# Patient Record
Sex: FEMALE | Race: WHITE | NOT HISPANIC OR LATINO | Employment: FULL TIME | URBAN - METROPOLITAN AREA
[De-identification: names, ages, dates, MRNs, and addresses within clinical notes are randomized per-mention and may not be internally consistent; named-entity substitution may affect disease eponyms.]

---

## 2023-08-08 LAB
EXTERNAL HEPATITIS B SURFACE ANTIGEN: NEGATIVE
EXTERNAL HEPATITIS C AB: NON REACTIVE
EXTERNAL RUBELLA QUALITATIVE: NORMAL
EXTERNAL SYPHILIS RPR SCREEN: NORMAL
HIV1 P24 AG SERPL QL IA: NORMAL

## 2024-02-20 LAB — EXTERNAL GROUP B STREP ANTIGEN: POSITIVE

## 2024-03-14 ENCOUNTER — HOSPITAL ENCOUNTER (INPATIENT)
Facility: HOSPITAL | Age: 34
LOS: 3 days | Discharge: HOME | End: 2024-03-17
Attending: OBSTETRICS & GYNECOLOGY | Admitting: OBSTETRICS & GYNECOLOGY
Payer: COMMERCIAL

## 2024-03-14 ENCOUNTER — ANESTHESIA (OUTPATIENT)
Dept: LABOR AND DELIVERY | Facility: HOSPITAL | Age: 34
End: 2024-03-14
Payer: COMMERCIAL

## 2024-03-14 ENCOUNTER — ANESTHESIA EVENT (OUTPATIENT)
Dept: LABOR AND DELIVERY | Facility: HOSPITAL | Age: 34
End: 2024-03-14
Payer: COMMERCIAL

## 2024-03-14 PROBLEM — Z34.90 PREGNANT: Status: ACTIVE | Noted: 2024-03-14

## 2024-03-14 LAB
ABO GROUP BLD: NORMAL
ALBUMIN SERPL-MCNC: 3.5 G/DL (ref 3.5–5.2)
ALBUMIN/GLOB SERPL: 1.3 G/DL
ALP SERPL-CCNC: 108 U/L (ref 39–117)
ALT SERPL W P-5'-P-CCNC: 14 U/L (ref 1–33)
ANION GAP SERPL CALCULATED.3IONS-SCNC: 15.9 MMOL/L (ref 5–15)
AST SERPL-CCNC: 23 U/L (ref 1–32)
BASOPHILS # BLD AUTO: 0.04 10*3/MM3 (ref 0–0.2)
BASOPHILS NFR BLD AUTO: 0.5 % (ref 0–1.5)
BILIRUB SERPL-MCNC: <0.2 MG/DL (ref 0–1.2)
BLD GP AB SCN SERPL QL: NEGATIVE
BUN SERPL-MCNC: 5 MG/DL (ref 6–20)
BUN/CREAT SERPL: 9.6 (ref 7–25)
CALCIUM SPEC-SCNC: 8.6 MG/DL (ref 8.6–10.5)
CHLORIDE SERPL-SCNC: 106 MMOL/L (ref 98–107)
CO2 SERPL-SCNC: 16.1 MMOL/L (ref 22–29)
CREAT SERPL-MCNC: 0.52 MG/DL (ref 0.57–1)
DEPRECATED RDW RBC AUTO: 40.9 FL (ref 37–54)
EGFRCR SERPLBLD CKD-EPI 2021: 127.6 ML/MIN/1.73
EOSINOPHIL # BLD AUTO: 0.29 10*3/MM3 (ref 0–0.4)
EOSINOPHIL NFR BLD AUTO: 3.3 % (ref 0.3–6.2)
ERYTHROCYTE [DISTWIDTH] IN BLOOD BY AUTOMATED COUNT: 13.6 % (ref 12.3–15.4)
GLOBULIN UR ELPH-MCNC: 2.7 GM/DL
GLUCOSE SERPL-MCNC: 130 MG/DL (ref 65–99)
HCT VFR BLD AUTO: 36.7 % (ref 34–46.6)
HGB BLD-MCNC: 12.4 G/DL (ref 12–15.9)
IMM GRANULOCYTES # BLD AUTO: 0.04 10*3/MM3 (ref 0–0.05)
IMM GRANULOCYTES NFR BLD AUTO: 0.5 % (ref 0–0.5)
LYMPHOCYTES # BLD AUTO: 2.2 10*3/MM3 (ref 0.7–3.1)
LYMPHOCYTES NFR BLD AUTO: 25.2 % (ref 19.6–45.3)
MCH RBC QN AUTO: 28.1 PG (ref 26.6–33)
MCHC RBC AUTO-ENTMCNC: 33.8 G/DL (ref 31.5–35.7)
MCV RBC AUTO: 83.2 FL (ref 79–97)
MONOCYTES # BLD AUTO: 0.51 10*3/MM3 (ref 0.1–0.9)
MONOCYTES NFR BLD AUTO: 5.8 % (ref 5–12)
NEUTROPHILS NFR BLD AUTO: 5.64 10*3/MM3 (ref 1.7–7)
NEUTROPHILS NFR BLD AUTO: 64.7 % (ref 42.7–76)
NRBC BLD AUTO-RTO: 0 /100 WBC (ref 0–0.2)
PLATELET # BLD AUTO: 193 10*3/MM3 (ref 140–450)
PMV BLD AUTO: 11.3 FL (ref 6–12)
POTASSIUM SERPL-SCNC: 3.9 MMOL/L (ref 3.5–5.2)
PROT SERPL-MCNC: 6.2 G/DL (ref 6–8.5)
RBC # BLD AUTO: 4.41 10*6/MM3 (ref 3.77–5.28)
RH BLD: POSITIVE
SODIUM SERPL-SCNC: 138 MMOL/L (ref 136–145)
T PALLIDUM IGG SER QL: NORMAL
T&S EXPIRATION DATE: NORMAL
WBC NRBC COR # BLD AUTO: 8.72 10*3/MM3 (ref 3.4–10.8)

## 2024-03-14 PROCEDURE — C1755 CATHETER, INTRASPINAL: HCPCS | Performed by: STUDENT IN AN ORGANIZED HEALTH CARE EDUCATION/TRAINING PROGRAM

## 2024-03-14 PROCEDURE — 10907ZC DRAINAGE OF AMNIOTIC FLUID, THERAPEUTIC FROM PRODUCTS OF CONCEPTION, VIA NATURAL OR ARTIFICIAL OPENING: ICD-10-PCS | Performed by: OBSTETRICS & GYNECOLOGY

## 2024-03-14 PROCEDURE — 86900 BLOOD TYPING SEROLOGIC ABO: CPT | Performed by: OBSTETRICS & GYNECOLOGY

## 2024-03-14 PROCEDURE — 86850 RBC ANTIBODY SCREEN: CPT | Performed by: OBSTETRICS & GYNECOLOGY

## 2024-03-14 PROCEDURE — 80053 COMPREHEN METABOLIC PANEL: CPT | Performed by: OBSTETRICS & GYNECOLOGY

## 2024-03-14 PROCEDURE — 25010000002 PENICILLIN G POTASSIUM PER 600000 UNITS: Performed by: OBSTETRICS & GYNECOLOGY

## 2024-03-14 PROCEDURE — 3E033VJ INTRODUCTION OF OTHER HORMONE INTO PERIPHERAL VEIN, PERCUTANEOUS APPROACH: ICD-10-PCS | Performed by: OBSTETRICS & GYNECOLOGY

## 2024-03-14 PROCEDURE — 25810000003 LACTATED RINGERS PER 1000 ML: Performed by: OBSTETRICS & GYNECOLOGY

## 2024-03-14 PROCEDURE — 85025 COMPLETE CBC W/AUTO DIFF WBC: CPT | Performed by: OBSTETRICS & GYNECOLOGY

## 2024-03-14 PROCEDURE — 86780 TREPONEMA PALLIDUM: CPT | Performed by: OBSTETRICS & GYNECOLOGY

## 2024-03-14 PROCEDURE — 25010000002 ROPIVACAINE PER 1 MG: Performed by: ANESTHESIOLOGY

## 2024-03-14 PROCEDURE — 86901 BLOOD TYPING SEROLOGIC RH(D): CPT | Performed by: OBSTETRICS & GYNECOLOGY

## 2024-03-14 RX ORDER — OXYTOCIN/0.9 % SODIUM CHLORIDE 30/500 ML
250 PLASTIC BAG, INJECTION (ML) INTRAVENOUS CONTINUOUS
Status: ACTIVE | OUTPATIENT
Start: 2024-03-15 | End: 2024-03-15

## 2024-03-14 RX ORDER — FAMOTIDINE 20 MG/1
TABLET, FILM COATED ORAL
COMMUNITY
End: 2024-03-17 | Stop reason: HOSPADM

## 2024-03-14 RX ORDER — FAMOTIDINE 10 MG/ML
20 INJECTION, SOLUTION INTRAVENOUS 2 TIMES DAILY PRN
Status: DISCONTINUED | OUTPATIENT
Start: 2024-03-14 | End: 2024-03-15 | Stop reason: HOSPADM

## 2024-03-14 RX ORDER — PRENATAL VIT NO.126/IRON/FOLIC 28MG-0.8MG
TABLET ORAL DAILY
COMMUNITY

## 2024-03-14 RX ORDER — ONDANSETRON 4 MG/1
4 TABLET, ORALLY DISINTEGRATING ORAL EVERY 6 HOURS PRN
Status: DISCONTINUED | OUTPATIENT
Start: 2024-03-14 | End: 2024-03-15 | Stop reason: HOSPADM

## 2024-03-14 RX ORDER — FENTANYL/ROPIVACAINE/NS/PF 2MCG/ML-.2
10 PLASTIC BAG, INJECTION (ML) INJECTION CONTINUOUS
Status: DISCONTINUED | OUTPATIENT
Start: 2024-03-14 | End: 2024-03-15

## 2024-03-14 RX ORDER — VIT C/HESPERIDIN/BIOFLAVONOIDS 500-100 MG
TABLET ORAL
COMMUNITY

## 2024-03-14 RX ORDER — OXYTOCIN/0.9 % SODIUM CHLORIDE 30/500 ML
2-20 PLASTIC BAG, INJECTION (ML) INTRAVENOUS
Status: DISCONTINUED | OUTPATIENT
Start: 2024-03-14 | End: 2024-03-15

## 2024-03-14 RX ORDER — FAMOTIDINE 20 MG/1
20 TABLET, FILM COATED ORAL 2 TIMES DAILY
COMMUNITY

## 2024-03-14 RX ORDER — MAGNESIUM CARB/ALUMINUM HYDROX 105-160MG
30 TABLET,CHEWABLE ORAL ONCE AS NEEDED
Status: DISCONTINUED | OUTPATIENT
Start: 2024-03-14 | End: 2024-03-15 | Stop reason: HOSPADM

## 2024-03-14 RX ORDER — OMEPRAZOLE 40 MG/1
CAPSULE, DELAYED RELEASE ORAL
COMMUNITY

## 2024-03-14 RX ORDER — ROPIVACAINE HYDROCHLORIDE 2 MG/ML
INJECTION, SOLUTION EPIDURAL; INFILTRATION; PERINEURAL AS NEEDED
Status: DISCONTINUED | OUTPATIENT
Start: 2024-03-14 | End: 2024-03-15 | Stop reason: SURG

## 2024-03-14 RX ORDER — ACETAMINOPHEN 325 MG/1
650 TABLET ORAL EVERY 4 HOURS PRN
Status: DISCONTINUED | OUTPATIENT
Start: 2024-03-14 | End: 2024-03-15 | Stop reason: HOSPADM

## 2024-03-14 RX ORDER — ONDANSETRON 2 MG/ML
4 INJECTION INTRAMUSCULAR; INTRAVENOUS EVERY 6 HOURS PRN
Status: DISCONTINUED | OUTPATIENT
Start: 2024-03-14 | End: 2024-03-15 | Stop reason: HOSPADM

## 2024-03-14 RX ORDER — OXYTOCIN/0.9 % SODIUM CHLORIDE 30/500 ML
999 PLASTIC BAG, INJECTION (ML) INTRAVENOUS ONCE
Status: DISCONTINUED | OUTPATIENT
Start: 2024-03-14 | End: 2024-03-15 | Stop reason: HOSPADM

## 2024-03-14 RX ORDER — SODIUM CHLORIDE 0.9 % (FLUSH) 0.9 %
10 SYRINGE (ML) INJECTION AS NEEDED
Status: DISCONTINUED | OUTPATIENT
Start: 2024-03-14 | End: 2024-03-15 | Stop reason: HOSPADM

## 2024-03-14 RX ORDER — CARBOPROST TROMETHAMINE 250 UG/ML
250 INJECTION, SOLUTION INTRAMUSCULAR
Status: DISCONTINUED | OUTPATIENT
Start: 2024-03-14 | End: 2024-03-15 | Stop reason: HOSPADM

## 2024-03-14 RX ORDER — MISOPROSTOL 200 UG/1
800 TABLET ORAL ONCE AS NEEDED
Status: DISCONTINUED | OUTPATIENT
Start: 2024-03-14 | End: 2024-03-15 | Stop reason: HOSPADM

## 2024-03-14 RX ORDER — FAMOTIDINE 20 MG/1
20 TABLET, FILM COATED ORAL 2 TIMES DAILY PRN
Status: DISCONTINUED | OUTPATIENT
Start: 2024-03-14 | End: 2024-03-15 | Stop reason: HOSPADM

## 2024-03-14 RX ORDER — DIPHENHYDRAMINE HYDROCHLORIDE 50 MG/ML
12.5 INJECTION INTRAMUSCULAR; INTRAVENOUS EVERY 8 HOURS PRN
Status: DISCONTINUED | OUTPATIENT
Start: 2024-03-14 | End: 2024-03-15 | Stop reason: HOSPADM

## 2024-03-14 RX ORDER — EPHEDRINE SULFATE 50 MG/ML
5 INJECTION, SOLUTION INTRAVENOUS
Status: DISCONTINUED | OUTPATIENT
Start: 2024-03-14 | End: 2024-03-15 | Stop reason: HOSPADM

## 2024-03-14 RX ORDER — SODIUM CHLORIDE, SODIUM LACTATE, POTASSIUM CHLORIDE, CALCIUM CHLORIDE 600; 310; 30; 20 MG/100ML; MG/100ML; MG/100ML; MG/100ML
125 INJECTION, SOLUTION INTRAVENOUS CONTINUOUS
Status: DISCONTINUED | OUTPATIENT
Start: 2024-03-14 | End: 2024-03-15

## 2024-03-14 RX ORDER — LIDOCAINE HYDROCHLORIDE 10 MG/ML
0.5 INJECTION, SOLUTION INFILTRATION; PERINEURAL ONCE AS NEEDED
Status: DISCONTINUED | OUTPATIENT
Start: 2024-03-14 | End: 2024-03-15 | Stop reason: HOSPADM

## 2024-03-14 RX ORDER — MISOPROSTOL 100 MCG
25 TABLET ORAL
Status: DISCONTINUED | OUTPATIENT
Start: 2024-03-14 | End: 2024-03-14

## 2024-03-14 RX ORDER — TERBUTALINE SULFATE 1 MG/ML
0.25 INJECTION, SOLUTION SUBCUTANEOUS AS NEEDED
Status: DISCONTINUED | OUTPATIENT
Start: 2024-03-14 | End: 2024-03-15 | Stop reason: HOSPADM

## 2024-03-14 RX ORDER — ONDANSETRON 2 MG/ML
4 INJECTION INTRAMUSCULAR; INTRAVENOUS ONCE AS NEEDED
Status: DISCONTINUED | OUTPATIENT
Start: 2024-03-14 | End: 2024-03-15 | Stop reason: HOSPADM

## 2024-03-14 RX ORDER — PENICILLIN G 3000000 [IU]/50ML
3 INJECTION, SOLUTION INTRAVENOUS EVERY 4 HOURS
Status: DISCONTINUED | OUTPATIENT
Start: 2024-03-14 | End: 2024-03-15 | Stop reason: HOSPADM

## 2024-03-14 RX ORDER — METHYLERGONOVINE MALEATE 0.2 MG/ML
200 INJECTION INTRAVENOUS ONCE AS NEEDED
Status: DISCONTINUED | OUTPATIENT
Start: 2024-03-14 | End: 2024-03-15 | Stop reason: HOSPADM

## 2024-03-14 RX ADMIN — PENICILLIN G 3 MILLION UNITS: 3000000 INJECTION, SOLUTION INTRAVENOUS at 20:44

## 2024-03-14 RX ADMIN — SODIUM CHLORIDE, POTASSIUM CHLORIDE, SODIUM LACTATE AND CALCIUM CHLORIDE 125 ML/HR: 600; 310; 30; 20 INJECTION, SOLUTION INTRAVENOUS at 21:08

## 2024-03-14 RX ADMIN — Medication 10 ML/HR: at 18:53

## 2024-03-14 RX ADMIN — SODIUM CHLORIDE, POTASSIUM CHLORIDE, SODIUM LACTATE AND CALCIUM CHLORIDE 125 ML/HR: 600; 310; 30; 20 INJECTION, SOLUTION INTRAVENOUS at 12:42

## 2024-03-14 RX ADMIN — ROPIVACAINE HYDROCHLORIDE 5 ML: 2 INJECTION, SOLUTION EPIDURAL; INFILTRATION at 18:47

## 2024-03-14 RX ADMIN — Medication 2 MILLI-UNITS/MIN: at 12:39

## 2024-03-14 RX ADMIN — ROPIVACAINE HYDROCHLORIDE 4 ML: 2 INJECTION, SOLUTION EPIDURAL; INFILTRATION at 18:49

## 2024-03-14 RX ADMIN — ROPIVACAINE HYDROCHLORIDE 4 ML: 2 INJECTION, SOLUTION EPIDURAL; INFILTRATION at 18:51

## 2024-03-14 RX ADMIN — PENICILLIN G 3 MILLION UNITS: 3000000 INJECTION, SOLUTION INTRAVENOUS at 16:32

## 2024-03-14 RX ADMIN — PENICILLIN G POTASSIUM 5 MILLION UNITS: 5000000 INJECTION, POWDER, FOR SOLUTION INTRAMUSCULAR; INTRAVENOUS at 12:39

## 2024-03-14 RX ADMIN — SODIUM CHLORIDE, POTASSIUM CHLORIDE, SODIUM LACTATE AND CALCIUM CHLORIDE 999 ML/HR: 600; 310; 30; 20 INJECTION, SOLUTION INTRAVENOUS at 18:31

## 2024-03-14 NOTE — PROGRESS NOTES
Pitocin @ 16 mu/min - not very uncomfortable yet.    SVE 2/80/-1 AROM copious clear fluid.   's Cat I  Driggs @ 3-5 minutes    Continue current care.     Bro Rodriges MD  3/14/2024    17:46 EDT

## 2024-03-14 NOTE — ANESTHESIA PREPROCEDURE EVALUATION
Anesthesia Evaluation     Patient summary reviewed and Nursing notes reviewed                Airway   Mallampati: II  TM distance: >3 FB  Neck ROM: full  no difficulty expected  Dental - normal exam     Pulmonary - normal exam   Cardiovascular - normal exam    (+)  carotid artery disease      Neuro/Psych  GI/Hepatic/Renal/Endo    (+) GERD    Musculoskeletal     Abdominal  - normal exam   Substance History      OB/GYN    (+) Pregnant        Other                    Anesthesia Plan    ASA 2     epidural     (  39w6d  )    Anesthetic plan, risks, benefits, and alternatives have been provided, discussed and informed consent has been obtained with: patient.    CODE STATUS:    Level Of Support Discussed With: Patient  Code Status (Patient has no pulse and is not breathing): CPR (Attempt to Resuscitate)  Medical Interventions (Patient has pulse or is breathing): Full Support

## 2024-03-14 NOTE — H&P
[facility]  Obstetric History and Physical    Patient Name: Afua Willoughby  :  1990  MRN:  [id number]      Chief Complaint   Patient presents with    Scheduled Induction     Scheduled induction. +FM. Denies Lof or VB. Has had some vicki schuler.       Subjective     Patient is a 33 y.o. female  currently at 39w6d, who presents for term IOL.       Her prenatal care has been uncomplicated with exception of GBS POS.    Past Medical History:   Diagnosis Date    COVID-19     GERD (gastroesophageal reflux disease)      Past Surgical History:   Procedure Laterality Date    MOUTH SURGERY      dental implants due to trauma     Bactrim [sulfamethoxazole-trimethoprim] and Sulfa antibiotics      Objective       Vital Signs Range for the last 24 hours  Temperature:     Temp Source:     BP: BP: (134)/(94) 134/94   Pulse: Heart Rate:  [107] 107   Respirations: Resp:  [16] 16                   Physical Examination:     General :  Alert in NAD  Abdomen: Gravid, EFW 7# 12 oz by serafinds    Presentation: vertex   Cervix: Exam by: Method: sterile exam per RN   Dilation: Cervical Dilation (cm): 1-2   Effacement: Cervical Effacement: 70%   Station: Fetal Station: -3       Fetal Heart Rate Assessment   Method: Fetal HR Assessment Method: Telemetry/Wireless Fetal HR Monitor   Beats/min: Fetal HR (beats/min): 140   Baseline: Fetal HR Baseline: normal range   Varibility: Fetal HR Variability: moderate (amplitude range 6 to 25 bpm)   Accels: Fetal HR Accelerations: greater than/equal to 15 bpm   Decels: Fetal HR Decelerations: absent   Tracing Category:       Uterine Assessment   Method: Method: Telemetry/Wireless Uterine Activity Monitor   Frequency (min): Contraction Frequency (Minutes): 2-8   Ctx Count in 10 min:     Duration:     Intensity: Contraction Intensity: mild by palpation   Intensity by IUPC:     Resting Tone: Uterine Resting Tone: soft by palpation   Resting Tone by IUPC:     Bard Units:            Results from last 7 days   Lab Units 24  1126   WBC 10*3/mm3 8.72   HEMOGLOBIN g/dL 12.4   HEMATOCRIT % 36.7   PLATELETS 10*3/mm3 193       Assessment & Plan     1.  Intrauterine pregnancy at 39w6d weeks gestation for term IOL cervix favorable.    reassuring fetal status.   Continue pitocin and AROM when able. Anticipate .  Plan of care has been reviewed with patient and family.  All questions answered.      Pregnant        H&P updated. The patient was examined and the following changes are noted above.         Bro Rodriges MD  3/14/2024  15:08 EDT

## 2024-03-14 NOTE — ANESTHESIA PROCEDURE NOTES
Labor Epidural      Patient reassessed immediately prior to procedure    Patient location during procedure: OB  Start Time: 3/14/2024 6:42 PM  Stop Time: 3/14/2024 6:47 PM  Indication:at surgeon's request  Performed By  Anesthesiologist: Jorge Medeiros MD  Preanesthetic Checklist  Completed: patient identified, IV checked, site marked, risks and benefits discussed, surgical consent, monitors and equipment checked, pre-op evaluation and timeout performed  Prep:  Pt Position:sitting  Sterile Tech:cap, gloves and mask  Prep:povidone-iodine 7.5% surgical scrub  Monitoring:blood pressure monitoring, continuous pulse oximetry and EKG  Epidural Block Procedure:  Approach:midline  Guidance:landmark technique  Location:L4-L5  Needle Type:Tuohy  Needle Gauge:17  Loss of Resistance Medium: air  Loss of Resistance: 7cm  Cath Depth at skin:10 cm  Paresthesia: none  Aspiration:negative  Test Dose:negative  Post Assessment:  Dressing:occlusive dressing applied and secured with tape  Pt Tolerance:patient tolerated the procedure well with no apparent complications  Complications:no

## 2024-03-15 PROCEDURE — 0KQM0ZZ REPAIR PERINEUM MUSCLE, OPEN APPROACH: ICD-10-PCS | Performed by: OBSTETRICS & GYNECOLOGY

## 2024-03-15 RX ORDER — IBUPROFEN 600 MG/1
600 TABLET ORAL EVERY 6 HOURS PRN
Status: DISCONTINUED | OUTPATIENT
Start: 2024-03-15 | End: 2024-03-17 | Stop reason: HOSPADM

## 2024-03-15 RX ORDER — DOCUSATE SODIUM 100 MG/1
100 CAPSULE, LIQUID FILLED ORAL 2 TIMES DAILY
Status: DISCONTINUED | OUTPATIENT
Start: 2024-03-15 | End: 2024-03-17 | Stop reason: HOSPADM

## 2024-03-15 RX ORDER — MISOPROSTOL 200 UG/1
600 TABLET ORAL ONCE AS NEEDED
Status: DISCONTINUED | OUTPATIENT
Start: 2024-03-15 | End: 2024-03-17 | Stop reason: HOSPADM

## 2024-03-15 RX ORDER — OXYTOCIN/0.9 % SODIUM CHLORIDE 30/500 ML
125 PLASTIC BAG, INJECTION (ML) INTRAVENOUS ONCE AS NEEDED
Status: COMPLETED | OUTPATIENT
Start: 2024-03-15 | End: 2024-03-15

## 2024-03-15 RX ORDER — PRENATAL VIT/IRON FUM/FOLIC AC 27MG-0.8MG
1 TABLET ORAL DAILY
Status: DISCONTINUED | OUTPATIENT
Start: 2024-03-15 | End: 2024-03-17 | Stop reason: HOSPADM

## 2024-03-15 RX ORDER — HYDROXYZINE 50 MG/1
50 TABLET, FILM COATED ORAL NIGHTLY PRN
Status: DISCONTINUED | OUTPATIENT
Start: 2024-03-15 | End: 2024-03-17 | Stop reason: HOSPADM

## 2024-03-15 RX ORDER — TRAMADOL HYDROCHLORIDE 50 MG/1
50 TABLET ORAL EVERY 6 HOURS PRN
Status: DISCONTINUED | OUTPATIENT
Start: 2024-03-15 | End: 2024-03-17 | Stop reason: HOSPADM

## 2024-03-15 RX ORDER — HYDROCORTISONE 25 MG/G
CREAM TOPICAL AS NEEDED
Status: DISCONTINUED | OUTPATIENT
Start: 2024-03-15 | End: 2024-03-17 | Stop reason: HOSPADM

## 2024-03-15 RX ORDER — ONDANSETRON 4 MG/1
4 TABLET, ORALLY DISINTEGRATING ORAL EVERY 8 HOURS PRN
Status: DISCONTINUED | OUTPATIENT
Start: 2024-03-15 | End: 2024-03-17 | Stop reason: HOSPADM

## 2024-03-15 RX ORDER — PANTOPRAZOLE SODIUM 40 MG/1
40 TABLET, DELAYED RELEASE ORAL
Status: DISCONTINUED | OUTPATIENT
Start: 2024-03-15 | End: 2024-03-17 | Stop reason: HOSPADM

## 2024-03-15 RX ORDER — CALCIUM CARBONATE 500 MG/1
2 TABLET, CHEWABLE ORAL 3 TIMES DAILY PRN
Status: DISCONTINUED | OUTPATIENT
Start: 2024-03-15 | End: 2024-03-17 | Stop reason: HOSPADM

## 2024-03-15 RX ORDER — PROMETHAZINE HYDROCHLORIDE 25 MG/1
25 TABLET ORAL EVERY 6 HOURS PRN
Status: DISCONTINUED | OUTPATIENT
Start: 2024-03-15 | End: 2024-03-17 | Stop reason: HOSPADM

## 2024-03-15 RX ORDER — ACETAMINOPHEN 325 MG/1
650 TABLET ORAL EVERY 6 HOURS PRN
Status: DISCONTINUED | OUTPATIENT
Start: 2024-03-15 | End: 2024-03-17 | Stop reason: HOSPADM

## 2024-03-15 RX ORDER — METHYLERGONOVINE MALEATE 0.2 MG/ML
200 INJECTION INTRAVENOUS ONCE AS NEEDED
Status: DISCONTINUED | OUTPATIENT
Start: 2024-03-15 | End: 2024-03-17 | Stop reason: HOSPADM

## 2024-03-15 RX ORDER — BISACODYL 10 MG
10 SUPPOSITORY, RECTAL RECTAL DAILY PRN
Status: DISCONTINUED | OUTPATIENT
Start: 2024-03-16 | End: 2024-03-17 | Stop reason: HOSPADM

## 2024-03-15 RX ORDER — TRANEXAMIC ACID 10 MG/ML
1000 INJECTION, SOLUTION INTRAVENOUS ONCE AS NEEDED
Status: DISCONTINUED | OUTPATIENT
Start: 2024-03-15 | End: 2024-03-17 | Stop reason: HOSPADM

## 2024-03-15 RX ORDER — PROMETHAZINE HYDROCHLORIDE 12.5 MG/1
12.5 SUPPOSITORY RECTAL EVERY 6 HOURS PRN
Status: DISCONTINUED | OUTPATIENT
Start: 2024-03-15 | End: 2024-03-17 | Stop reason: HOSPADM

## 2024-03-15 RX ORDER — OXYTOCIN/0.9 % SODIUM CHLORIDE 30/500 ML
125 PLASTIC BAG, INJECTION (ML) INTRAVENOUS CONTINUOUS PRN
Status: DISCONTINUED | OUTPATIENT
Start: 2024-03-15 | End: 2024-03-17 | Stop reason: HOSPADM

## 2024-03-15 RX ORDER — KETOROLAC TROMETHAMINE 15 MG/ML
15 INJECTION, SOLUTION INTRAMUSCULAR; INTRAVENOUS EVERY 6 HOURS PRN
Status: ACTIVE | OUTPATIENT
Start: 2024-03-15 | End: 2024-03-15

## 2024-03-15 RX ORDER — ONDANSETRON 2 MG/ML
4 INJECTION INTRAMUSCULAR; INTRAVENOUS EVERY 6 HOURS PRN
Status: DISCONTINUED | OUTPATIENT
Start: 2024-03-15 | End: 2024-03-17 | Stop reason: HOSPADM

## 2024-03-15 RX ADMIN — ACETAMINOPHEN 325MG 650 MG: 325 TABLET ORAL at 12:05

## 2024-03-15 RX ADMIN — Medication 125 ML/HR: at 01:03

## 2024-03-15 RX ADMIN — PANTOPRAZOLE SODIUM 40 MG: 40 TABLET, DELAYED RELEASE ORAL at 09:22

## 2024-03-15 RX ADMIN — DOCUSATE SODIUM 100 MG: 100 CAPSULE, LIQUID FILLED ORAL at 09:22

## 2024-03-15 RX ADMIN — IBUPROFEN 600 MG: 600 TABLET, FILM COATED ORAL at 09:30

## 2024-03-15 RX ADMIN — IBUPROFEN 600 MG: 600 TABLET, FILM COATED ORAL at 15:43

## 2024-03-15 RX ADMIN — IBUPROFEN 600 MG: 600 TABLET, FILM COATED ORAL at 23:36

## 2024-03-15 RX ADMIN — ACETAMINOPHEN 325MG 650 MG: 325 TABLET ORAL at 03:39

## 2024-03-15 RX ADMIN — DOCUSATE SODIUM 100 MG: 100 CAPSULE, LIQUID FILLED ORAL at 23:35

## 2024-03-15 RX ADMIN — Medication 1 TABLET: at 09:22

## 2024-03-15 RX ADMIN — Medication: at 05:26

## 2024-03-15 NOTE — LACTATION NOTE
P1,T Placed baby on mom to bf and he spit a large amount of clear fluid out. Suctioned and educated parents on safety and bulb syringe. He has tight lower oral restriction and mild upper that needs to be evaluated by Peds. Will retry to bf in 1-2 hours. Baby is going to the nursery for safety precautions while parents rest. LC number is on WB.   She has a PBP.

## 2024-03-15 NOTE — PLAN OF CARE
Problem: Adult Inpatient Plan of Care  Goal: Plan of Care Review  3/15/2024 0058 by Ivett Corea RN  Outcome: Ongoing, Progressing  Flowsheets (Taken 3/15/2024 0057)  Outcome Evaluation: , qbl 95, kale with baby  3/15/2024 0057 by Ivett Corea RN  Outcome: Ongoing, Progressing  Flowsheets (Taken 3/15/2024 0057)  Progress: improving  Outcome Evaluation: , qbl 95, kale with baby  Goal: Patient-Specific Goal (Individualized)  3/15/2024 0058 by Ivett Corea RN  Outcome: Ongoing, Progressing  3/15/2024 0057 by Ivett Corea RN  Outcome: Ongoing, Progressing  Goal: Absence of Hospital-Acquired Illness or Injury  3/15/2024 0058 by Ivett Corea RN  Outcome: Ongoing, Progressing  3/15/2024 0057 by Ivett Corea RN  Outcome: Ongoing, Progressing  Goal: Optimal Comfort and Wellbeing  3/15/2024 0058 by Ivett Corea RN  Outcome: Ongoing, Progressing  3/15/2024 0057 by Ivett Corea RN  Outcome: Ongoing, Progressing  Intervention: Provide Person-Centered Care  Recent Flowsheet Documentation  Taken 3/15/2024 0045 by Ivett Corea RN  Trust Relationship/Rapport:   care explained   choices provided   emotional support provided   empathic listening provided   questions answered   questions encouraged   reassurance provided   thoughts/feelings acknowledged  Goal: Readiness for Transition of Care  3/15/2024 0058 by Ivett Corea RN  Outcome: Ongoing, Progressing  3/15/2024 0057 by Ivett Corea RN  Outcome: Ongoing, Progressing     Problem: Skin Injury Risk Increased  Goal: Skin Health and Integrity  3/15/2024 0058 by Ivett Corea RN  Outcome: Ongoing, Progressing  3/15/2024 0057 by Ivett Corea RN  Outcome: Ongoing, Progressing     Problem: Device-Related Complication Risk (Anesthesia/Analgesia, Neuraxial)  Goal: Safe Infusion Delivery Completion  3/15/2024 0058 by Ivett Corea RN  Outcome: Ongoing, Progressing  3/15/2024 0057 by Ivett Corea RN  Outcome: Ongoing, Progressing    Goal Outcome Evaluation:           Progress: improving  Outcome Evaluation: chani, qtello 95, kale with baby

## 2024-03-15 NOTE — PLAN OF CARE
Goal Outcome Evaluation:  Plan of Care Reviewed With: patient, spouse        Progress: improving  Outcome Evaluation: Patient at term IOL started on pitocin.  AROM @ 1652.  SVE 4cm, 80%, -1.  3 doses of PCN given.  Epidural in place and comfortable.  Plans for .

## 2024-03-15 NOTE — LACTATION NOTE
Pt reports baby isnt latching so she is  pumping for now and giving baby all pumped colostrum per syringe. Educated on baby's feeding patterns. Encouraged to cont pumping as needed every 2-3 hours if baby not waking to latch. Encouraged pt to also hand express colostrum. Reviewed how to hand express. Encouraged pt to review provided booklet on BF and call LC as needed.    Lactation Consult Note    Evaluation Completed: 3/15/2024 09:10 EDT  Patient Name: Afua Willoughby  :  1990  MRN:  [id number]     REFERRAL  INFORMATION:                                         DELIVERY HISTORY:        Skin to skin initiation date/time: 3/15/2024  12:15 AM   Skin to skin end date/time: 3/15/2024  1:59 AM        MATERNAL ASSESSMENT:                               INFANT ASSESSMENT:  Information for the patient's :  Kwaku Willoughby [[id number]]   No past medical history on file.                                                                                                   MATERNAL INFANT FEEDING:                                                                       EQUIPMENT TYPE:                                 BREAST PUMPING:          LACTATION REFERRALS:

## 2024-03-15 NOTE — LACTATION NOTE
Pt reports she pumped 0.6 cc of colostrum. LC assisted pt with latching baby to left breast. Pt reports this is the best baby has latched. Encouraged hand expression and pumping and supplementing colostrum. Educated on importance of deep latching and ways to achieve it. Encouraged to call LC as needed.      Lactation Consult Note    Evaluation Completed: 3/15/2024 13:36 EDT  Patient Name: Afua Willoughby  :  1990  MRN:  [id number]     REFERRAL  INFORMATION:                                         DELIVERY HISTORY:        Skin to skin initiation date/time: 3/15/2024  12:15 AM   Skin to skin end date/time: 3/15/2024  1:59 AM        MATERNAL ASSESSMENT:                               INFANT ASSESSMENT:  Information for the patient's :  Kwaku Willoughby [[id number]]   No past medical history on file.                                                                                                   MATERNAL INFANT FEEDING:                                                                       EQUIPMENT TYPE:                                 BREAST PUMPING:          LACTATION REFERRALS:

## 2024-03-15 NOTE — L&D DELIVERY NOTE
[facility]  Vaginal Delivery Note    Patient Name: Afua Willoughby  :  1990  MRN:  [id number]    DX:    Pregnant      Labor status: Induction of labor     Delivery     Delivery: Vaginal, Spontaneous     YOB: 2022    Time of Birth: 12:13 AM      Anesthesia: Epidural     Delivering clinician: Bro Rodriges MD       Infant    Findings: Viable male  infant    Infant observations: Weight: 3270 g (7 lb 3.3 oz)        Apgars: 8  @ 1 minute /    9  @ 5 minutes     Placenta, Cord, and Fluid    Placenta delivered  Spontaneous   at  3/15/2024 12:16 AM     Cord: 3 vessels  present.   Cord blood obtained: Yes          Repair    Episiotomy: No   Lacerations: 2nd degree     Quantitative Blood Loss:     Quantitative Blood Loss (mL): 78 mL         Delivery narrative: Afua Willoughby is a 33 y.o.  at 40w0d.  Presented  for term IOL. Started on pitocin.  Had  artificial rupture of membranes  at 4:52 PM  on 3/14/2024   Received epidural anesthesia. Progressed normally to C/C/+1 @ 3/14/2024  11:41 PM . Labored down until 3/14/2024 11:47 PM . Fetal status reassuring throughout.  of viable  male  @ 12:13 AM  over an intact perineum. ASDE. Delayed cord clamping performed and infant placed in kangaroo care.   3270 g (7 lb 3.3 oz) .     Apgar :  8  @ 1 minute / 9  @ 5 minutes.  Placenta delivered spontaneously, intact with 3 vessel cord. Cervix and rectum intact. 2nd degree laceration repaired in usual fashion with 3-0 Monocryl suture. Mother and baby recovering good condition.       Bro Rodriges MD  03/15/24  15:47 EDT    Note done at time of delivery then refreshed at a later date to include RN documentation not yet completed.

## 2024-03-15 NOTE — ANESTHESIA POSTPROCEDURE EVALUATION
Patient: Afua Willoughby    Procedure Summary       Date: 03/14/24 Room / Location:     Anesthesia Start: 1842 Anesthesia Stop: 03/15/24 0013    Procedure: LABOR ANALGESIA Diagnosis:     Scheduled Providers:  Provider: Basil Robins MD    Anesthesia Type: epidural ASA Status: 2            Anesthesia Type: epidural    Vitals  Vitals Value Taken Time   /171 03/15/24 0017   Temp 36.9 °C (98.4 °F) 03/14/24 2140   Pulse 97 03/15/24 0025   Resp 16 03/14/24 1934   SpO2 100 % 03/15/24 0025   Vitals shown include unfiled device data.    Vitals:    03/14/24 2325   BP:    Pulse: 78   Resp:    Temp:    SpO2: 99%         Post Anesthesia Care and Evaluation      Comments: Anesthesia present throughout labor and delivery

## 2024-03-16 LAB
BASOPHILS # BLD AUTO: 0.04 10*3/MM3 (ref 0–0.2)
BASOPHILS NFR BLD AUTO: 0.5 % (ref 0–1.5)
DEPRECATED RDW RBC AUTO: 38.2 FL (ref 37–54)
EOSINOPHIL # BLD AUTO: 0.43 10*3/MM3 (ref 0–0.4)
EOSINOPHIL NFR BLD AUTO: 5.1 % (ref 0.3–6.2)
ERYTHROCYTE [DISTWIDTH] IN BLOOD BY AUTOMATED COUNT: 13.1 % (ref 12.3–15.4)
HCT VFR BLD AUTO: 31.6 % (ref 34–46.6)
HGB BLD-MCNC: 10.4 G/DL (ref 12–15.9)
IMM GRANULOCYTES # BLD AUTO: 0.04 10*3/MM3 (ref 0–0.05)
IMM GRANULOCYTES NFR BLD AUTO: 0.5 % (ref 0–0.5)
LYMPHOCYTES # BLD AUTO: 2.8 10*3/MM3 (ref 0.7–3.1)
LYMPHOCYTES NFR BLD AUTO: 33.1 % (ref 19.6–45.3)
MCH RBC QN AUTO: 26.7 PG (ref 26.6–33)
MCHC RBC AUTO-ENTMCNC: 32.9 G/DL (ref 31.5–35.7)
MCV RBC AUTO: 81 FL (ref 79–97)
MONOCYTES # BLD AUTO: 0.66 10*3/MM3 (ref 0.1–0.9)
MONOCYTES NFR BLD AUTO: 7.8 % (ref 5–12)
NEUTROPHILS NFR BLD AUTO: 4.49 10*3/MM3 (ref 1.7–7)
NEUTROPHILS NFR BLD AUTO: 53 % (ref 42.7–76)
NRBC BLD AUTO-RTO: 0 /100 WBC (ref 0–0.2)
PLATELET # BLD AUTO: 227 10*3/MM3 (ref 140–450)
PMV BLD AUTO: 10.8 FL (ref 6–12)
RBC # BLD AUTO: 3.9 10*6/MM3 (ref 3.77–5.28)
WBC NRBC COR # BLD AUTO: 8.46 10*3/MM3 (ref 3.4–10.8)

## 2024-03-16 PROCEDURE — 85025 COMPLETE CBC W/AUTO DIFF WBC: CPT | Performed by: OBSTETRICS & GYNECOLOGY

## 2024-03-16 RX ADMIN — IBUPROFEN 600 MG: 600 TABLET, FILM COATED ORAL at 09:20

## 2024-03-16 RX ADMIN — ACETAMINOPHEN 325MG 650 MG: 325 TABLET ORAL at 21:06

## 2024-03-16 RX ADMIN — DOCUSATE SODIUM 100 MG: 100 CAPSULE, LIQUID FILLED ORAL at 09:20

## 2024-03-16 RX ADMIN — TRAMADOL HYDROCHLORIDE 50 MG: 50 TABLET ORAL at 01:29

## 2024-03-16 RX ADMIN — ACETAMINOPHEN 325MG 650 MG: 325 TABLET ORAL at 11:42

## 2024-03-16 RX ADMIN — MUPIROCIN 1 APPLICATION: 20 OINTMENT TOPICAL at 06:35

## 2024-03-16 RX ADMIN — Medication 1 TABLET: at 09:20

## 2024-03-16 RX ADMIN — DOCUSATE SODIUM 100 MG: 100 CAPSULE, LIQUID FILLED ORAL at 21:06

## 2024-03-16 RX ADMIN — IBUPROFEN 600 MG: 600 TABLET, FILM COATED ORAL at 16:12

## 2024-03-16 NOTE — PROGRESS NOTES
[facility]  Vaginal Delivery Progress Note    Patient Name: Afua Willoughby  :  1990  MRN:  [id number]      Subjective   Postpartum Day 1: Vaginal Delivery of a male infant.     The patient feels well without complaints.  Her pain is well controlled.  Reports normal lochia.     The patient plans to breastfeed.    Objective     Vital Signs Range for the last 24 hours  Temperature: Temp:  [97.5 °F (36.4 °C)-98.6 °F (37 °C)] 97.5 °F (36.4 °C)       BP: BP: (118-135)/(78-90) 118/78   Pulse: Heart Rate:  [69-86] 69   Respirations: Resp:  [16-18] 16                       Physical Exam:  General: Awake and alert  Abdomen: Fundus: firm, non tender, 2fb below umbilicus  Extremities:  trace edema, NT     Labs:     Results from last 7 days   Lab Units 24  0645 24  1126   WBC 10*3/mm3 8.46 8.72   HEMOGLOBIN g/dL 10.4* 12.4   HEMATOCRIT % 31.6* 36.7   PLATELETS 10*3/mm3 227 193       Prenatal labs results reviewed:  Yes   Rubella:  Immune  Rh Status:    RH type   Date Value Ref Range Status   2024 Positive  Final         Assessment & Plan  : 1. PPD1 S/P  - Doing well, continue usual cares.   Desires circumcision for her infant. Reviewed risks and benefits. Consent obtained.      2. Pp anemia - no symptom iron on discharge.                  Bro Rodriges MD  3/16/2024  12:19 EDT

## 2024-03-16 NOTE — PLAN OF CARE
Goal Outcome Evaluation:  Plan of Care Reviewed With: patient        Progress: improving  Outcome Evaluation: VSS, assessment WDL, pain controlled, breastfeeding and bonding with infant

## 2024-03-16 NOTE — LACTATION NOTE
This note was copied from a baby's chart.  Informed PT, ALVAREZ is available to help with BF until 2000. Mom reports baby is BF really well today.  PT denieis any questions. Encouraged to call if needing BF help

## 2024-03-17 VITALS
HEART RATE: 91 BPM | BODY MASS INDEX: 29.49 KG/M2 | TEMPERATURE: 97.9 F | HEIGHT: 65 IN | OXYGEN SATURATION: 100 % | DIASTOLIC BLOOD PRESSURE: 76 MMHG | WEIGHT: 177 LBS | RESPIRATION RATE: 16 BRPM | SYSTOLIC BLOOD PRESSURE: 113 MMHG

## 2024-03-17 RX ORDER — IBUPROFEN 600 MG/1
600 TABLET ORAL EVERY 6 HOURS
Qty: 40 TABLET | Refills: 0 | Status: SHIPPED | OUTPATIENT
Start: 2024-03-17

## 2024-03-17 RX ORDER — ACETAMINOPHEN 500 MG
500 TABLET ORAL EVERY 6 HOURS
Qty: 40 TABLET | Refills: 0 | Status: SHIPPED | OUTPATIENT
Start: 2024-03-17

## 2024-03-17 RX ORDER — TRANEXAMIC ACID 10 MG/ML
1000 INJECTION, SOLUTION INTRAVENOUS ONCE AS NEEDED
Status: DISCONTINUED | OUTPATIENT
Start: 2024-03-17 | End: 2024-03-17 | Stop reason: HOSPADM

## 2024-03-17 RX ORDER — AMOXICILLIN 250 MG
1 CAPSULE ORAL 2 TIMES DAILY PRN
Qty: 30 TABLET | Refills: 1 | Status: SHIPPED | OUTPATIENT
Start: 2024-03-17 | End: 2024-04-16

## 2024-03-17 RX ADMIN — Medication 1 TABLET: at 10:15

## 2024-03-17 RX ADMIN — Medication: at 02:01

## 2024-03-17 RX ADMIN — DOCUSATE SODIUM 100 MG: 100 CAPSULE, LIQUID FILLED ORAL at 10:15

## 2024-03-17 RX ADMIN — IBUPROFEN 600 MG: 600 TABLET, FILM COATED ORAL at 02:01

## 2024-03-17 RX ADMIN — IBUPROFEN 600 MG: 600 TABLET, FILM COATED ORAL at 10:15

## 2024-03-17 NOTE — LACTATION NOTE
This note was copied from a baby's chart.  PT is going home today. Reports baby is BF well. Informed her about the [facility] info on the back of the edicational booklet. Discussed engourgement, pumping, milk storage and when to expect mature milk. Mom said her breast already feel tight and she thinks her milk is in. Ice packs provided.PT denieis any questions.

## 2024-03-17 NOTE — DISCHARGE SUMMARY
Vaginal Delivery Discharge Summary      Date of Admission: 3/14/2024    Date of Discharge:      Patient Name: Afua Willoughby  :  1990  MRN:  [id number]      Surgeon/OB: Bro Rodriges     Discharge Diagnosis: Vaginal Delivery at 40w0d, uncomplicated recovery    Procedures:  Vaginal, Spontaneous     3/15/2024    12:13 AM     Circumcision of male infant     Anesthesia:  Epidural     Prenatal labs/vax:   Immunization History   Administered Date(s) Administered    ABRYSVO (RSV, 60+ or pregnant women 32-36 wks) 2024         External Prenatal Results       Pregnancy Outside Results - Transcribed From Office Records - See Scanned Records For Details       Test Value Date Time    ABO  B  24 1126    Rh  Positive  24 1126    Antibody Screen  Negative  24 1126    Varicella IgG       Rubella ^ Immune  23     Hgb  10.4 g/dL 24 0645       12.4 g/dL 24 1126    Hct  31.6 % 24 0645       36.7 % 24 1126    Glucose Fasting GTT       Glucose Tolerance Test 1 hour       Glucose Tolerance Test 3 hour       Gonorrhea (discrete)       Chlamydia (discrete)       RPR ^ Non-Reactive  23     VDRL       Syphilis Antibody       HBsAg ^ Negative  23     Herpes Simplex Virus PCR       Herpes Simplex VIrus Culture       HIV ^ Non-Reactive  23     Hep C RNA Quant PCR       Hep C Antibody ^ non reactive  23     AFP       Group B Strep ^ Positive  24     GBS Susceptibility to Clindamycin       GBS Susceptibility to Erythromycin       Fetal Fibronectin       Genetic Testing, Maternal Blood                    Presenting Problem/History of Present Illness  Pregnant [Z34.90]  Pregnant [Z34.90]  Spontaneous vaginal delivery [O80]       Pregnant      Hospital Course  Patient is a 33 y.o. female  at 40w0d status post vaginal delivery.  Delivery was uncomplicated  Uneventful recovery.  Patient is ambulating, tolerating a regular diet.      Infant:   male   fetus 3270 g (7 lb 3.3 oz)  with Apgar scores of 8 , 9  at five minutes.    Vital Signs  Temp:  [97.4 °F (36.3 °C)-98.3 °F (36.8 °C)] 97.9 °F (36.6 °C)  Heart Rate:  [81-91] 91  Resp:  [16] 16  BP: (113-140)/(76-92) 113/76    Results from last 7 days   Lab Units 24  0645 24  1126   WBC 10*3/mm3 8.46 8.72   HEMOGLOBIN g/dL 10.4* 12.4   HEMATOCRIT % 31.6* 36.7   PLATELETS 10*3/mm3 227 193     Results from last 7 days   Lab Units 24  1126   SODIUM mmol/L 138   POTASSIUM mmol/L 3.9   CHLORIDE mmol/L 106   CO2 mmol/L 16.1*   BUN mg/dL 5*   CREATININE mg/dL 0.52*   CALCIUM mg/dL 8.6   BILIRUBIN mg/dL <0.2   ALK PHOS U/L 108   ALT (SGPT) U/L 14   AST (SGOT) U/L 23   GLUCOSE mg/dL 130*         Physical Exam:   General: Awake and alert   Abdomen: Fundus: firm, non tender    Extremities:  Calves NT bilaterally      Discharge Medications     Discharge Medications        New Medications        Instructions Start Date   acetaminophen 500 MG tablet  Commonly known as: TYLENOL   500 mg, Oral, Every 6 Hours, Alternate every 3 hours with ibuprofen      ibuprofen 600 MG tablet  Commonly known as: ADVIL,MOTRIN   600 mg, Oral, Every 6 Hours, Alternate every 3 hours with acetaminophen      sennosides-docusate 8.6-50 MG per tablet  Commonly known as: PERICOLACE   1 tablet, Oral, 2 Times Daily PRN             Changes to Medications        Instructions Start Date   famotidine 20 MG tablet  Commonly known as: PEPCID  What changed: Another medication with the same name was removed. Continue taking this medication, and follow the directions you see here.   20 mg, Oral, 2 Times Daily             Continue These Medications        Instructions Start Date   omeprazole 40 MG capsule  Commonly known as: priLOSEC       prenatal (CLASSIC) vitamin 28-0.8 MG tablet tablet  Generic drug: prenatal vitamin   Oral, Daily      Zinc 30 MG tablet              Stop These Medications      Abrysvo 120 MCG/0.5ML reconstituted solution  injection  Generic drug: RSV Pre-Fusion F A&B Vac Rcmb     aspirin 81 MG oral suspension              Discharge Diet: Regular    Activity at Discharge: restrictions reviewed (I.e. pelvic rest until postpartum visit). Call physician if pain becomes severe, bleeding saturating greater than 1 pad per hour, temp greater than 100.4, or unable to tolerate oral diet      Condition on Discharge:  Stable    Discharge Disposition  Home or Self Care    Discharge Instructions:  Activity at Discharge: restrictions reviewed (I.e. pelvic rest until postpartum visit). May shower as usual, encouraged Sitz baths for perineal comfort.     Patient is to call the office for:   Vaginal bleeding greater than 1 pad per hour  Temperature greater than 101  Excessive vomiting or diarrhea  Mental or Mood changes  Shortness of breath or chest pain  Pain that is worsening or current pain medication is not adequate  Weight increase by 2 lbs per day or 5 lbs per week  Redness, swelling or drainage, or pulling apart of incision    Discharge Follow-up  2 week Telehealth visit  6 week Post-partum visit      Marielena Mireles MD  03/17/24  10:34 EDT

## 2024-03-19 NOTE — PAYOR COMM NOTE
"Afua Willoughby (33 y.o. Female)       Date of Birth   1990    Social Security Number       Address   [address] [address]    Home Phone   [phone]    MRN   [id number]       Taoism   None    Marital Status                               Admission Date   3/14/24    Admission Type   Elective    Admitting Provider   Bro Rodriges MD    Attending Provider       Department, Room/Bed   [facility], E353/1       Discharge Date   3/17/2024    Discharge Disposition   Home or Self Care    Discharge Destination                                 Attending Provider: (none)   Allergies: Bactrim [Sulfamethoxazole-trimethoprim], Sulfa Antibiotics    Isolation: None   Infection: None   Code Status: Prior    Ht: 165.1 cm (65\")   Wt: 80.3 kg (177 lb)    Admission Cmt: None   Principal Problem: Pregnant [Z34.90]                   Active Insurance as of 3/14/2024       Primary Coverage       Payor Plan Insurance Group Employer/Plan Group    ANTHEM BLUE CROSS ANTHEM BLUE CROSS BLUE SHIELD PPO [id number]       Payor Plan Address Payor Plan Phone Number Payor Plan Fax Number Effective Dates    [address] [phone]  2024 - None Entered    [place] [address]         Subscriber Name Subscriber Birth Date Member ID       AFUA WILLOUGHBY 1990 [id number]                     Emergency Contacts            No emergency contacts on file.              Insurance Information                  ANTHEM BLUE CROSS/ANTHEM BLUE CROSS BLUE SHIELD PPO Phone: [phone]    Subscriber: Bricebasilio Afua O Subscriber#: [id number]    Group#: [id number] Precert#: --             History & Physical        [facility] at 03/15/24 0721           Bro Rodriges MD at 24 [facility]          [facility]  Obstetric History and Physical    Patient Name: Afua Willoughby  :  1990  MRN:  [id number]      Chief Complaint   Patient presents with    Scheduled " Induction     Scheduled induction. +FM. Denies Lof or VB. Has had some vicki schuler.       Subjective    Patient is a 33 y.o. female  currently at 39w6d, who presents for term IOL.       Her prenatal care has been uncomplicated with exception of GBS POS.    Past Medical History:   Diagnosis Date    COVID-19     GERD (gastroesophageal reflux disease)      Past Surgical History:   Procedure Laterality Date    MOUTH SURGERY      dental implants due to trauma     Bactrim [sulfamethoxazole-trimethoprim] and Sulfa antibiotics      Objective      Vital Signs Range for the last 24 hours  Temperature:     Temp Source:     BP: BP: (134)/(94) 134/94   Pulse: Heart Rate:  [107] 107   Respirations: Resp:  [16] 16                   Physical Examination:     General :  Alert in NAD  Abdomen: Gravid, EFW 7# 12 oz by leopolds    Presentation: vertex   Cervix: Exam by: Method: sterile exam per RN   Dilation: Cervical Dilation (cm): 1-2   Effacement: Cervical Effacement: 70%   Station: Fetal Station: -3       Fetal Heart Rate Assessment   Method: Fetal HR Assessment Method: Telemetry/Wireless Fetal HR Monitor   Beats/min: Fetal HR (beats/min): 140   Baseline: Fetal HR Baseline: normal range   Varibility: Fetal HR Variability: moderate (amplitude range 6 to 25 bpm)   Accels: Fetal HR Accelerations: greater than/equal to 15 bpm   Decels: Fetal HR Decelerations: absent   Tracing Category:       Uterine Assessment   Method: Method: Telemetry/Wireless Uterine Activity Monitor   Frequency (min): Contraction Frequency (Minutes): 2-8   Ctx Count in 10 min:     Duration:     Intensity: Contraction Intensity: mild by palpation   Intensity by IUPC:     Resting Tone: Uterine Resting Tone: soft by palpation   Resting Tone by IUPC:     [place] Units:           Results from last 7 days   Lab Units 24  1126   WBC 10*3/mm3 8.72   HEMOGLOBIN g/dL 12.4   HEMATOCRIT % 36.7   PLATELETS 10*3/mm3 193       Assessment & Plan    1.  Intrauterine  pregnancy at 39w6d weeks gestation for term IOL cervix favorable.    reassuring fetal status.   Continue pitocin and AROM when able. Anticipate .  Plan of care has been reviewed with patient and family.  All questions answered.      Pregnant        H&P updated. The patient was examined and the following changes are noted above.         Bro Rodriges MD  3/14/2024  15:08 EDT        Electronically signed by Bro Rodriges MD at 24 1510       Facility-Administered Medications as of 3/17/2024   Medication Dose Route Frequency Provider Last Rate Last Admin    [] ketorolac (TORADOL) injection 15 mg  15 mg Intravenous Q6H PRN Bro Rodriges MD        [] lactated ringers bolus 1,000 mL  1,000 mL Intravenous Once PRN Bro Rodriges MD        [] oxytocin (PITOCIN) 30 units in 0.9% sodium chloride 500 mL (premix)  250 mL/hr Intravenous Continuous Bro Rodriges MD        [COMPLETED] oxytocin (PITOCIN) 30 units in 0.9% sodium chloride 500 mL (premix)  125 mL/hr Intravenous Once PRN Bro Rodriges  mL/hr at 03/15/24 0103 125 mL/hr at 03/15/24 0103    [COMPLETED] penicillin G potassium 5 Million Units in sodium chloride 0.9 % 100 mL MBP  5 Million Units Intravenous Once Bro Rodriges MD   5 Million Units at 24 1239     Lab Results (last 72 hours)       Procedure Component Value Units Date/Time    CBC & Differential [[id number]]  (Abnormal) Collected: 24 0645    Specimen: Blood Updated: 24 0731    Narrative:      The following orders were created for panel order CBC & Differential.  Procedure                               Abnormality         Status                     ---------                               -----------         ------                     CBC Auto Differential[[id number]]        Abnormal            Final result                 Please view results for these tests on the individual orders.    CBC Auto Differential [[id number]]   (Abnormal) Collected: 03/16/24 0645    Specimen: Blood Updated: 03/16/24 0731     WBC 8.46 10*3/mm3      RBC 3.90 10*6/mm3      Hemoglobin 10.4 g/dL      Hematocrit 31.6 %      MCV 81.0 fL      MCH 26.7 pg      MCHC 32.9 g/dL      RDW 13.1 %      RDW-SD 38.2 fl      MPV 10.8 fL      Platelets 227 10*3/mm3      Neutrophil % 53.0 %      Lymphocyte % 33.1 %      Monocyte % 7.8 %      Eosinophil % 5.1 %      Basophil % 0.5 %      Immature Grans % 0.5 %      Neutrophils, Absolute 4.49 10*3/mm3      Lymphocytes, Absolute 2.80 10*3/mm3      Monocytes, Absolute 0.66 10*3/mm3      Eosinophils, Absolute 0.43 10*3/mm3      Basophils, Absolute 0.04 10*3/mm3      Immature Grans, Absolute 0.04 10*3/mm3      nRBC 0.0 /100 WBC           Imaging Results (Last 72 Hours)       ** No results found for the last 72 hours. **          ECG/EMG Results (last 72 hours)       ** No results found for the last 72 hours. **          Orders (last 72 hrs)        Start     Ordered    03/17/24 1035  Discontinue IV  Once,   Status:  Canceled         03/17/24 1034    03/17/24 1034  Discharge patient  Once         03/17/24 1034    03/17/24 0444  Transfer to postpartum when discharge criteria met.  Until Discontinued,   Status:  Canceled         03/17/24 0443    03/17/24 0443  tranexamic acid 1000 mg in 100 mL 0.7% NaCl infusion (premix)  Once As Needed,   Status:  Discontinued         03/17/24 0443    03/17/24 0000  Lifting Restrictions         03/17/24 1034    03/17/24 0000  Bathing Restrictions        Comments: No tub baths until 6 weeks postpartum, may take Sitz baths    03/17/24 1034    03/17/24 0000  Sexual Activity Restrictions         03/17/24 1034    03/17/24 0000  Activity as Tolerated         03/17/24 1034    03/17/24 0000  Gradually Increase Activity Until at Pre-Hospitalization Level         03/17/24 1034    03/17/24 0000  Pelvic Rest        Comments: For 6 weeks (until 6 week postpartum office visit)    03/17/24 1034    03/17/24 0000   Discharge Follow-up with Specified Provider: [facility]         03/17/24 1034    03/17/24 0000  Call MD With Problems / Concerns        Comments: Instructions:  Call for heavy vaginal bleeding (more than 1 pad per hour), severe abdominal or vaginal pain, fever over 100.6, redness or drainage from the incision/laceration.    03/17/24 1034    03/17/24 0000  ibuprofen (ADVIL,MOTRIN) 600 MG tablet  Every 6 Hours         03/17/24 1034    03/17/24 0000  acetaminophen (TYLENOL) 500 MG tablet  Every 6 Hours         03/17/24 1034    03/17/24 0000  sennosides-docusate (senna-docusate sodium) 8.6-50 MG per tablet  2 Times Daily PRN         03/17/24 1034 03/16/24 0427  all purpose nipple ointment 1 Application  Every 2 Hours PRN,   Status:  Discontinued         03/16/24 0427    03/16/24 0000  bisacodyl (DULCOLAX) suppository 10 mg  Daily PRN,   Status:  Discontinued         03/15/24 0333    03/15/24 0900  prenatal vitamin tablet 1 tablet  Daily,   Status:  Discontinued         03/15/24 0333    03/15/24 0900  docusate sodium (COLACE) capsule 100 mg  2 Times Daily,   Status:  Discontinued         03/15/24 0333    03/15/24 0600  pantoprazole (PROTONIX) EC tablet 40 mg  Every Early Morning,   Status:  Discontinued         03/15/24 0333    03/15/24 0334  Ambulate Patient  Every Shift,   Status:  Canceled       03/15/24 0333    03/15/24 0333  Hydrocort-Pramoxine (Perianal) (PROCTOFOAM-HS) 1-1 % rectal foam 1 Application  As Needed,   Status:  Discontinued         03/15/24 0333    03/15/24 0333  Hydrocortisone (Perianal) (ANUSOL-HC) 2.5 % rectal cream  As Needed,   Status:  Discontinued         03/15/24 0333    03/15/24 0333  benzocaine (AMERICAINE) 20 % rectal ointment  As Needed,   Status:  Discontinued         03/15/24 0333    03/15/24 0333  ondansetron ODT (ZOFRAN-ODT) disintegrating tablet 4 mg  Every 8 Hours PRN,   Status:  Discontinued         03/15/24 0333    03/15/24 0333  ondansetron (ZOFRAN) injection 4 mg  Every 6  "Hours PRN,   Status:  Discontinued         03/15/24 0333    03/15/24 0333  promethazine (PHENERGAN) tablet 25 mg  Every 6 Hours PRN,   Status:  Discontinued        Placed in \"Or\" Linked Group    03/15/24 0333    03/15/24 0333  promethazine (PHENERGAN) suppository 12.5 mg  Every 6 Hours PRN,   Status:  Discontinued        Placed in \"Or\" Linked Group    03/15/24 0333    03/15/24 0333  calcium carbonate (TUMS) chewable tablet 500 mg (200 mg elemental)  3 Times Daily PRN,   Status:  Discontinued         03/15/24 0333    03/15/24 0333  Apply Ice to Perineum  As Needed,   Status:  Canceled      Comments: For 20 min q 2 hrs    03/15/24 0333    03/15/24 0333  Waffle Cushion  As Needed,   Status:  Canceled      Comments: For perineal discomfort    03/15/24 0333    03/15/24 0333  Donut Ring  As Needed,   Status:  Canceled      Comments: For perineal pain    03/15/24 0333    03/15/24 0333  Kpad  As Needed,   Status:  Canceled      Comments: For pain    03/15/24 0333    03/15/24 0333  Apply witch hazel pads / TUCKS to perineum as needed for comfort PRN  As Needed,   Status:  Canceled       03/15/24 0333    03/15/24 0333  Warm compress  As Needed,   Status:  Canceled       03/15/24 0333    03/15/24 0333  Apply ace wrap, tight bra, or binder  As Needed,   Status:  Canceled       03/15/24 0333    03/15/24 0333  Apply ice packs  As Needed,   Status:  Canceled       03/15/24 0333    03/15/24 0333  oxytocin (PITOCIN) 30 units in 0.9% sodium chloride 500 mL (premix)  Continuous PRN,   Status:  Discontinued         03/15/24 0333    03/15/24 0333  ibuprofen (ADVIL,MOTRIN) tablet 600 mg  Every 6 Hours PRN,   Status:  Discontinued         03/15/24 0333    03/15/24 0333  acetaminophen (TYLENOL) tablet 650 mg  Every 6 Hours PRN,   Status:  Discontinued         03/15/24 0333    03/15/24 0333  traMADol (ULTRAM) tablet 50 mg  Every 6 Hours PRN,   Status:  Discontinued         03/15/24 0333    03/15/24 0333  miSOPROStol (CYTOTEC) tablet 600 mcg "  Once As Needed,   Status:  Discontinued         03/15/24 0333    03/15/24 0333  methylergonovine (METHERGINE) injection 200 mcg  Once As Needed,   Status:  Discontinued         03/15/24 0333    03/15/24 0333  tranexamic acid 1000 mg in 100 mL 0.7% NaCl infusion (premix)  Once As Needed,   Status:  Discontinued         03/15/24 0333    03/15/24 0333  hydrOXYzine (ATARAX) tablet 50 mg  Nightly PRN,   Status:  Discontinued         03/15/24 0333    03/15/24 0333  magnesium hydroxide (MILK OF MAGNESIA) suspension 10 mL  Daily PRN,   Status:  Discontinued         03/15/24 0333    03/15/24 0333  benzocaine-menthol (DERMOPLAST) 20-0.5 % topical spray  As Needed,   Status:  Discontinued         03/15/24 0333    03/14/24 0000  Group B Streptococcus Culture - Swab, Vaginal/Rectum        Comments: This is an external result entered through the Results Console.      03/14/24 1140    03/14/24 0000  Hepatitis C Antibody        Comments: This is an external result entered through the Results Console.      03/14/24 1140    03/14/24 0000  Hepatitis B Surface Antigen        Comments: This is an external result entered through the Results Console.      03/14/24 1140    03/14/24 0000  RPR        Comments: This is an external result entered through the Results Console.      03/14/24 1140    03/14/24 0000  Rubella Antibody, IgG        Comments: This is an external result entered through the Results Console.      03/14/24 1140    03/14/24 0000  HIV-1 Antibody, EIA        Comments: This is an external result entered through the Results Console.      03/14/24 1140    --  famotidine (PEPCID) 20 MG tablet  2 Times Daily         03/14/24 1121    --  aspirin 81 MG oral suspension  Status:  Discontinued         03/14/24 1122    --  omeprazole (priLOSEC) 40 MG capsule         03/14/24 1122    --  Zinc 30 MG tablet         03/14/24 1122    --  famotidine (PEPCID) 20 MG tablet  Status:  Discontinued         03/14/24 1122    --  prenatal vitamin  (prenatal, CLASSIC, vitamin) tablet  Daily         03/14/24 1122                  Operative/Procedure Notes (last 72 hours)  Notes from 03/16/24 1602 through 03/19/24 1602   No notes of this type exist for this encounter.       Physician Progress Notes (last 72 hours)  Notes from 03/16/24 1602 through 03/19/24 1602   No notes of this type exist for this encounter.       Consult Notes (last 72 hours)  Notes from 03/16/24 1602 through 03/19/24 1602   No notes of this type exist for this encounter.

## 2024-03-28 ENCOUNTER — MATERNAL SCREENING (OUTPATIENT)
Dept: CALL CENTER | Facility: HOSPITAL | Age: 34
End: 2024-03-28
Payer: COMMERCIAL

## 2024-03-28 NOTE — OUTREACH NOTE
Maternal Screening Survey      Flowsheet Row Responses   Facility patient discharged from? [place]   Attempt successful? Yes   Call start time    Call end time 1142   Person spoke with today (if not patient) and relationship patient   EPD Scale: Able to Laugh 0-->as much as she always could   EPD Scale: Looked Forward 0-->as much as she ever did   EPD Scale: Blamed Self 0-->no, never   EPD Scale: Been Anxious 0-->no, not at all   EPD Scale: Felt Panicky 0-->no, not at all   EPD Scale: Things Getting on Top 0-->no, has been coping as well as ever   EPD Scale: Difficulty Sleeping 0-->no, not at all   EPD Scale: Sad or Miserable 0-->no, not at all   EPD Scale: Crying 0-->no, never   EPD Scale: Thought of Harming Self 0-->never   [place]  Depression Score 0   Did any of your parents have problems with alcohol or drug use? No   Do any of your peers have problems with alcohol or drug use? No   Does your partner have problems with alcohol or drug use? No   Before you were pregnant did you have problems with alcohol or drug use? (past) No   In the past month, did you drink beer, wine, liquor or use any other drugs? (pregnancy) No   Maternal Screening call completed Yes   Call end time 1142              Kevin ROWLEY - Registered Nurse